# Patient Record
Sex: FEMALE | Race: BLACK OR AFRICAN AMERICAN | NOT HISPANIC OR LATINO | Employment: PART TIME | URBAN - METROPOLITAN AREA
[De-identification: names, ages, dates, MRNs, and addresses within clinical notes are randomized per-mention and may not be internally consistent; named-entity substitution may affect disease eponyms.]

---

## 2020-12-01 LAB
CRC RECOMMENDATION EXT: NORMAL
CRC RECOMMENDATION EXT: NORMAL

## 2024-07-16 ENCOUNTER — HOSPITAL ENCOUNTER (OUTPATIENT)
Dept: RADIOLOGY | Facility: HOSPITAL | Age: 49
Discharge: HOME | End: 2024-07-16
Attending: FAMILY MEDICINE
Payer: COMMERCIAL

## 2024-07-16 ENCOUNTER — OFFICE VISIT (OUTPATIENT)
Dept: FAMILY MEDICINE | Facility: CLINIC | Age: 49
End: 2024-07-16
Payer: COMMERCIAL

## 2024-07-16 VITALS
HEIGHT: 68 IN | WEIGHT: 270.38 LBS | BODY MASS INDEX: 40.98 KG/M2 | OXYGEN SATURATION: 100 % | HEART RATE: 68 BPM | DIASTOLIC BLOOD PRESSURE: 84 MMHG | SYSTOLIC BLOOD PRESSURE: 130 MMHG

## 2024-07-16 DIAGNOSIS — Z00.00 ENCOUNTER FOR MEDICAL EXAMINATION TO ESTABLISH CARE: Primary | ICD-10-CM

## 2024-07-16 DIAGNOSIS — Z13.220 LIPID SCREENING: ICD-10-CM

## 2024-07-16 DIAGNOSIS — G89.29 CHRONIC PAIN OF LEFT KNEE: ICD-10-CM

## 2024-07-16 DIAGNOSIS — M25.562 CHRONIC PAIN OF LEFT KNEE: ICD-10-CM

## 2024-07-16 DIAGNOSIS — K21.00 GASTROESOPHAGEAL REFLUX DISEASE WITH ESOPHAGITIS WITHOUT HEMORRHAGE: ICD-10-CM

## 2024-07-16 DIAGNOSIS — I10 HYPERTENSION, UNSPECIFIED TYPE: ICD-10-CM

## 2024-07-16 DIAGNOSIS — Z79.899 ON STIMULANT MEDICATION: ICD-10-CM

## 2024-07-16 DIAGNOSIS — Z11.59 NEED FOR HEPATITIS C SCREENING TEST: ICD-10-CM

## 2024-07-16 DIAGNOSIS — Z79.899 ENCOUNTER FOR LONG-TERM (CURRENT) USE OF OTHER MEDICATIONS: ICD-10-CM

## 2024-07-16 DIAGNOSIS — D53.9 NUTRITIONAL ANEMIA: ICD-10-CM

## 2024-07-16 DIAGNOSIS — Z11.4 SCREENING FOR HIV (HUMAN IMMUNODEFICIENCY VIRUS): ICD-10-CM

## 2024-07-16 DIAGNOSIS — Z12.31 SCREENING MAMMOGRAM FOR HIGH-RISK PATIENT: ICD-10-CM

## 2024-07-16 DIAGNOSIS — Z86.73 HISTORY OF STROKE: ICD-10-CM

## 2024-07-16 DIAGNOSIS — E11.65 TYPE 2 DIABETES MELLITUS WITH HYPERGLYCEMIA, WITHOUT LONG-TERM CURRENT USE OF INSULIN: ICD-10-CM

## 2024-07-16 PROBLEM — I66.01 STENOSIS OF RIGHT MIDDLE CEREBRAL ARTERY: Status: ACTIVE | Noted: 2019-05-20

## 2024-07-16 PROBLEM — I65.29 CAROTID STENOSIS: Status: ACTIVE | Noted: 2019-09-11

## 2024-07-16 PROBLEM — I63.9 ACUTE CVA (CEREBROVASCULAR ACCIDENT): Status: ACTIVE | Noted: 2019-05-20

## 2024-07-16 PROCEDURE — 73562 X-RAY EXAM OF KNEE 3: CPT | Mod: TC,PO,LT

## 2024-07-16 PROCEDURE — 73560 X-RAY EXAM OF KNEE 1 OR 2: CPT | Mod: 26,59,RT, | Performed by: RADIOLOGY

## 2024-07-16 PROCEDURE — 99386 PREV VISIT NEW AGE 40-64: CPT | Mod: S$GLB,,, | Performed by: FAMILY MEDICINE

## 2024-07-16 PROCEDURE — 1159F MED LIST DOCD IN RCRD: CPT | Mod: CPTII,S$GLB,, | Performed by: FAMILY MEDICINE

## 2024-07-16 PROCEDURE — 4010F ACE/ARB THERAPY RXD/TAKEN: CPT | Mod: CPTII,S$GLB,, | Performed by: FAMILY MEDICINE

## 2024-07-16 PROCEDURE — 73562 X-RAY EXAM OF KNEE 3: CPT | Mod: 26,LT,, | Performed by: RADIOLOGY

## 2024-07-16 PROCEDURE — 1160F RVW MEDS BY RX/DR IN RCRD: CPT | Mod: CPTII,S$GLB,, | Performed by: FAMILY MEDICINE

## 2024-07-16 PROCEDURE — 3079F DIAST BP 80-89 MM HG: CPT | Mod: CPTII,S$GLB,, | Performed by: FAMILY MEDICINE

## 2024-07-16 PROCEDURE — 3075F SYST BP GE 130 - 139MM HG: CPT | Mod: CPTII,S$GLB,, | Performed by: FAMILY MEDICINE

## 2024-07-16 PROCEDURE — 99999 PR PBB SHADOW E&M-EST. PATIENT-LVL V: CPT | Mod: PBBFAC,,, | Performed by: FAMILY MEDICINE

## 2024-07-16 PROCEDURE — 3008F BODY MASS INDEX DOCD: CPT | Mod: CPTII,S$GLB,, | Performed by: FAMILY MEDICINE

## 2024-07-16 RX ORDER — FERROUS SULFATE TAB 325 MG (65 MG ELEMENTAL FE) 325 (65 FE) MG
TAB ORAL EVERY OTHER DAY
COMMUNITY
End: 2024-07-16 | Stop reason: SDUPTHER

## 2024-07-16 RX ORDER — NAPROXEN SODIUM 220 MG/1
TABLET, FILM COATED ORAL
COMMUNITY
Start: 2024-07-01

## 2024-07-16 RX ORDER — CLOPIDOGREL BISULFATE 75 MG/1
1 TABLET ORAL DAILY
COMMUNITY

## 2024-07-16 RX ORDER — TIRZEPATIDE 10 MG/.5ML
10 INJECTION, SOLUTION SUBCUTANEOUS WEEKLY
Qty: 13 EACH | Refills: 1 | Status: SHIPPED | OUTPATIENT
Start: 2024-07-16

## 2024-07-16 RX ORDER — PANTOPRAZOLE SODIUM 40 MG/1
1 TABLET, DELAYED RELEASE ORAL DAILY
COMMUNITY
Start: 2023-10-02 | End: 2024-07-16 | Stop reason: SDUPTHER

## 2024-07-16 RX ORDER — TIRZEPATIDE 10 MG/.5ML
INJECTION, SOLUTION SUBCUTANEOUS
COMMUNITY
Start: 2024-06-20 | End: 2024-07-16 | Stop reason: SDUPTHER

## 2024-07-16 RX ORDER — LISINOPRIL 20 MG/1
20 TABLET ORAL DAILY
Qty: 60 TABLET | Refills: 4 | Status: SHIPPED | OUTPATIENT
Start: 2024-07-16

## 2024-07-16 RX ORDER — FERROUS SULFATE TAB 325 MG (65 MG ELEMENTAL FE) 325 (65 FE) MG
325 TAB ORAL EVERY OTHER DAY
Qty: 90 TABLET | Refills: 4 | Status: SHIPPED | OUTPATIENT
Start: 2024-07-16

## 2024-07-16 RX ORDER — DEXTROAMPHETAMINE SACCHARATE, AMPHETAMINE ASPARTATE, DEXTROAMPHETAMINE SULFATE AND AMPHETAMINE SULFATE 7.5; 7.5; 7.5; 7.5 MG/1; MG/1; MG/1; MG/1
30 TABLET ORAL 2 TIMES DAILY
COMMUNITY

## 2024-07-16 RX ORDER — PANTOPRAZOLE SODIUM 40 MG/1
40 TABLET, DELAYED RELEASE ORAL DAILY
Qty: 60 TABLET | Refills: 4 | Status: SHIPPED | OUTPATIENT
Start: 2024-07-16

## 2024-07-16 NOTE — PATIENT INSTRUCTIONS
Follow up in about 6 months (around 1/16/2025), or if symptoms worsen or fail to improve, for Med refills, LAB RESULTS.     Dear patient,   As a result of recent federal legislation (The Federal Cures Act), you may receive lab or pathology results from your visit in your [facility] account before your physician is able to contact you. Your physician or their representative will relay the results to you with their recommendations at their soonest availability.     If no improvement in symptoms or symptoms worsen, please be advised to call MD, follow-up at clinic and/or go to ER if becomes severe.    Maykel Oliver M.D.        We Offer TELEHEALTH & Same Day Appointments!   Book your Telehealth appointment with me through my nurse or   Clinic appointments on [employer]!    [address] [place] [address]    Office: [phone]   FAX: [phone]    Check out my Facebook Page and Follow Me at: [email or web address]    Check out my website at [employer] by clicking on: [email or web address]    To Schedule appointments online, go to [employer]: [email or web address]

## 2024-07-16 NOTE — PROGRESS NOTES
This note is specifically for wellness visit performed today.   WELLNESS EXAM      Patient ID: Alina Hyatt is a 48 y.o. female with  has a past medical history of Diabetes mellitus, type 2 and Hypertension.   Chief Complaint:  Encounter for wellness exam    Well Adult Physical: Patient here for a comprehensive physical exam.The patient reports Chronic problems.    New patient.  Patient is here to establish care. Transitioning care from [facility] PCP- Dr. Candelario ([facility]) [place]  OBGYN- Sol  Neuro- Gamaliel FELDER- Severo  GI- Dr. Orosco (last colon 2020)  Gastric- 2021- Dr. Hernandez - [facility]  Plastic Surgery Dr. Ram- breast reduction     History of Present Illness  The patient is a 48-year-old female here to establish care with a new doctor. She has chronic medical conditions including reflux, iron deficiency, some knee pain. She needs to see an orthopedic doctor. She has never been screened for hepatitis C or HIV. She agrees to screening. No known exposure. She would like to be checked for her anemia. She is due for a mammogram. The patient had a gastric bypass in 2021.    The patient, previously under the care of [facility], is due for blood work. She underwent a breast reduction surgery performed by Dr. Ram, a plastic surgeon in 2021. She is scheduled for a follow-up appointment next month. Her iron levels were found to be low during her last visit, necessitating daily iron supplementation. However, her levels returned to normal upon recheck in the early part of 2023. She was instructed to take iron tablets every other day, but she currently lacks a prescription. She has a history of reflux and is currently on Protonix. She had a mild stroke in 2019, for which she takes aspirin and Plavix. She experienced numbness radiating up her right arm at work, which led her to visit the emergency room. She has not seen Dr. Alston in over a year. Dr. 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 She is  "currently taking Adderall, which was prescribed by previous PCP, prior to her weight loss surgery.  Patient unsure of diagnosis.  She is also taking Mounjaro 10 mg once a week for diabetes. Her weight has remained stable. She has metformin, but she does not take it.    The patient experiences a popping sensation in her knee. She underwent imaging prior to her surgery approximately 3 years ago. Prior to her weight loss surgery, she was receiving knee injections. However, after a week, she noticed inflammation and swelling in her knee, which does not cause her discomfort.   Diabetes Management Status    Statin: Not taking  ACE/ARB: Taking    Screening or Prevention Patient's value Goal Complete/Controlled?   HgA1C Testing and Control   Lab Results   Component Value Date    HGBA1C 8.3 (H) 04/02/2007      Annually/Less than 8% No   Lipid profile Most Recent Lipid Panel Health Maintenance Topic Completion: Not Found Annually No   LDL control Lab Results   Component Value Date    LDLCALC 95.2 04/02/2007    Annually/Less than 100 mg/dl  No   Nephropathy screening Lab Results   Component Value Date    LABMICR 58.1 04/02/2007     No results found for: "PROTEINUA"  No results found for: "UTPCR"   Annually No   Blood pressure BP Readings from Last 1 Encounters:   07/16/24 130/84    Less than 140/90 Yes   Dilated retinal exam Most Recent Eye Exam Date: Not Found Annually No   Foot exam   Most Recent Foot Exam Date: Not Found Annually No     CHRONIC. STABLE. BP Reviewed.  Compliant with BP medications. No SE reported.   (-) CP, SOB, palpitations, dizziness, lightheadedness, HA, arm numbness, tingling or weakness, syncope.  Creatinine   Date Value Ref Range Status   06/29/2022 0.77 0.60 - 1.10 mg/dL Final   04/02/2007 0.9 0.5 - 1.4 mg/dl Final       CHRONIC.  History of stroke. Lab analysis reviewed.   (-) CP, SOB, abdominal pain, N/V/D, constipation, jaundice, skin changes.  (-) Myalgias  Lab Results   Component Value Date    " CHOL 149 04/02/2007     Lab Results   Component Value Date    HDL 35 (L) 04/02/2007     Lab Results   Component Value Date    LDLCALC 95.2 04/02/2007     Lab Results   Component Value Date    TRIG 94 04/02/2007     Lab Results   Component Value Date    CHOLHDL 23.5 04/02/2007     Lab Results   Component Value Date    TOTALCHOLEST 4.3 04/02/2007     Lab Results   Component Value Date    ALT 9 06/29/2022    AST 12 06/29/2022    ALKPHOS 89 06/29/2022    BILITOT 0.3 (L) 06/29/2022     ======================================================    Do you take any herbs or supplements that were not prescribed by a doctor? no   Are you taking calcium supplements? no      History:   LMP: No LMP recorded.   MMG:  No relevant family history has been documented.  History of breast reduction.  REASON FOR EXAM: [Z12.31]-Encounter for screening mammogram for malignant neoplasm of breast    TECHNICAL FACTORS: Digital mammography with tomosynthesis was performed of the breasts in the mediolateral oblique and craniocaudal views. CAD was utilized.    CLINICAL INFORMATION: This is a female patient for screening mammogram. According to the National Cancer [employer] Valerie Model risk assessment tool, her lifetime breast cancer risk is 8.9%.    COMPARISON: 05/04/2021, 07/30/2019, 01/11/2018    FINDINGS: The breasts are almost entirely fatty. There is no evidence of suspicious mass, calcifications or architectural distortion. Benign calcifications are present in the breasts.    IMPRESSION:  BI-RADS 1 - Negative.    No mammographic findings of malignancy are identified. Annual mammography is recommended.    BREAST DENSITY: Predominantly Fatty    The patient has been entered into our radiology information system, and she will receive notification approximately 30 days prior to the due date of her next annual screening mammogram.    Electronically signed by Irwin Moseley MD on 6/2/2022 9:23 AM  Exam End: 05/31/22 10:14    Specimen Collected:  22 09:09      PAP: No result found requesting records  Colon cancer screening:   Up-to-date.  Requesting records from GI.     Health Maintenance Topics with due status: Not Due       Topic Last Completion Date    Influenza Vaccine 2017    Colorectal Cancer Screening 2020      ==============================================  History reviewed.   Health Maintenance Due   Topic Date Due    Hepatitis C Screening  Never done    Cervical Cancer Screening  Never done    HIV Screening  Never done    TETANUS VACCINE  10/14/2019    Lipid Panel  2020    Hemoglobin A1c  2020    Mammogram  2023    Shingles Vaccine (1 of 2) Never done       Past Medical History:  Past Medical History:   Diagnosis Date    Diabetes mellitus, type 2     Hypertension      Past Surgical History:   Procedure Laterality Date     SECTION      CHOLECYSTECTOMY      TONSILLECTOMY      TUBAL LIGATION       Review of patient's allergies indicates:   Allergen Reactions    Amoxicillin-pot clavulanate Nausea And Vomiting    Ceftriaxone Other (See Comments) and Rash     Involuntary muscle movements of arms    Tramadol Anxiety and Other (See Comments)     Shaky, paranoid     Current Outpatient Medications on File Prior to Visit   Medication Sig Dispense Refill    aspirin 81 MG Chew CHEW 1 TABLET BY MOUTH ONCE DAILY      clopidogreL (PLAVIX) 75 mg tablet Take 1 tablet by mouth once daily.      dextroamphetamine-amphetamine (ADDERALL) 30 mg Tab Take 30 mg by mouth 2 (two) times a day.      [DISCONTINUED] FEROSUL 325 mg (65 mg iron) Tab tablet Take by mouth every other day.      [DISCONTINUED] lisinopril (PRINIVIL,ZESTRIL) 20 MG tablet Take 1 tablet (20 mg total) by mouth once daily. 30 tablet 0    [DISCONTINUED] MOUNJARO 10 mg/0.5 mL PnIj SMARTSIG:10 Milligram(s) SUB-Q Once a Week      [DISCONTINUED] pantoprazole (PROTONIX) 40 MG tablet Take 1 tablet by mouth once daily.      [DISCONTINUED] albuterol 90 mcg/actuation inhaler  Inhale 2 puffs into the lungs every 6 (six) hours as needed for Wheezing. 18 g 0    [DISCONTINUED] atenolol (TENORMIN) 25 MG tablet Take 25 mg by mouth once daily. (Patient not taking: Reported on 7/16/2024)      [DISCONTINUED] fluticasone (FLONASE) 50 mcg/actuation nasal spray 2 sprays by Each Nare route once daily. 16 g 0    [DISCONTINUED] metformin (GLUCOPHAGE) 1000 MG tablet Take 1 tablet (1,000 mg total) by mouth 2 (two) times daily with meals. (Patient not taking: Reported on 7/16/2024) 60 tablet 0     No current facility-administered medications on file prior to visit.     Social History     Socioeconomic History    Marital status: Single   Tobacco Use    Smoking status: Never     Social Determinants of Health     Transportation Needs: No Transportation Needs (10/5/2023)    Received from [facility]     Lack of Transportation (Medical): No     Lack of Transportation (Non-Medical): No     Family History   Problem Relation Name Age of Onset    Diabetes Mother         Review of Systems   Constitutional:  Negative for chills, fatigue, fever and unexpected weight change.   HENT:  Negative for ear pain and sore throat.    Eyes:  Negative for redness and visual disturbance.   Respiratory:  Negative for cough and shortness of breath.    Cardiovascular:  Negative for chest pain and palpitations.   Gastrointestinal:  Negative for nausea and vomiting.   Genitourinary:  Negative for difficulty urinating and hematuria.   Musculoskeletal:  Positive for arthralgias. Negative for myalgias.   Skin:  Negative for rash and wound.   Neurological:  Negative for weakness and headaches.   Psychiatric/Behavioral:  Negative for sleep disturbance. The patient is not nervous/anxious.       Objective:    Nursing note and vitals reviewed.  Vitals:    07/16/24 0849   BP: 130/84   Pulse: 68     Body mass index is 41.11 kg/m².   Physical Exam  Vitals and nursing note reviewed.   Constitutional:       General: She  is not in acute distress.     Appearance: She is well-developed. She is not ill-appearing, toxic-appearing or diaphoretic.   HENT:      Head: Normocephalic and atraumatic.      Right Ear: Hearing and external ear normal.      Left Ear: Hearing and external ear normal.      Nose: Nose normal. No rhinorrhea.   Eyes:      General: Lids are normal.      Extraocular Movements: Extraocular movements intact.      Conjunctiva/sclera: Conjunctivae normal.      Pupils: Pupils are equal, round, and reactive to light.   Cardiovascular:      Rate and Rhythm: Normal rate.      Pulses: Normal pulses.   Pulmonary:      Effort: Pulmonary effort is normal. No respiratory distress.      Breath sounds: Normal breath sounds.   Abdominal:      General: Bowel sounds are normal.      Palpations: Abdomen is soft.   Musculoskeletal:         General: Tenderness present. Normal range of motion.      Cervical back: Normal range of motion and neck supple.      Left knee: Bony tenderness and crepitus present. No swelling, deformity, effusion, erythema, ecchymosis or lacerations. Normal range of motion. Tenderness present.   Skin:     General: Skin is warm and dry.      Capillary Refill: Capillary refill takes less than 2 seconds.      Coloration: Skin is not pale.   Neurological:      General: No focal deficit present.      Mental Status: She is alert and oriented to person, place, and time. She is not disoriented.      Cranial Nerves: No cranial nerve deficit.      Motor: No weakness.      Gait: Gait normal.   Psychiatric:         Attention and Perception: She is attentive.         Mood and Affect: Mood normal. Mood is not anxious or depressed.         Speech: Speech is not rapid and pressured or slurred.         Behavior: Behavior normal. Behavior is not agitated, aggressive or hyperactive. Behavior is cooperative.         Thought Content: Thought content normal. Thought content is not paranoid or delusional. Thought content does not include  homicidal or suicidal ideation. Thought content does not include homicidal or suicidal plan.         Cognition and Memory: Memory is not impaired.         Judgment: Judgment normal.       Historical on 04/02/2007   Component Date Value Ref Range Status    WBC 04/02/2007 8.20  4.8 - 10.8 K/uL Final    RBC 04/02/2007 4.46  4.00 - 5.40 M/uL Final    Hemoglobin 04/02/2007 13.0  12.0 - 16.0 gm/dl Final    Hematocrit 04/02/2007 39.0  37.0 - 48.5 % Final    MCV 04/02/2007 87.4  82 - 95 fL Final    MCH 04/02/2007 29.1  27 - 31 pg Final    MCHC 04/02/2007 33.3  32 - 36 % Final    RDW 04/02/2007 13.4  11.5 - 14.5 % Final    Gran % 04/02/2007 46.8  40 - 76 % Final    Lymph % 04/02/2007 42.7 (H)  25 - 40 % Final    Mono % 04/02/2007 7.4  0 - 10 % Final    Eosinophil % 04/02/2007 2.9  0.0 - 8.0 % Final    Basophil % 04/02/2007 0.2  0 - 2 % Final    Gran # (ANC) 04/02/2007 3.8  1.4 - 8.7 K/uL Final    Lymph # 04/02/2007 3.5  1.2 - 3.5 K/uL Final    Mono # 04/02/2007 0.6  0.1 - 0.8 K/uL Final    Eos # 04/02/2007 0.2  0.0 - 0.4 K/uL Final    Baso # 04/02/2007 0.0  0.0 - 0.1 K/uL Final    Platelets 04/02/2007 265  150 - 350 K/uL Final    MPV 04/02/2007 11.3  9.2 - 12.9 fL Final    Glucose 04/02/2007 165 (H)  70 - 110 mg/dl Final    BUN 04/02/2007 9  5 - 23 mg/dl Final    Creatinine 04/02/2007 0.9  0.5 - 1.4 mg/dl Final    Calcium 04/02/2007 9.3  8.7 - 10.5 mg/dl Final    Sodium 04/02/2007 138  136 - 145 mMol/l Final    Potassium 04/02/2007 3.8  3.3 - 5.3 mMol/l Final    Chloride 04/02/2007 103  95 - 110 mMol/l Final    Total Protein 04/02/2007 7.1  6.0 - 8.4 gm/dl Final    Albumin 04/02/2007 4.1  3.5 - 5.2 g/dl Final    Total Bilirubin 04/02/2007 0.3  0.1 - 1.0 mg/dl Final    Comment: These are the guidelines recommended by the .  Especially  for infants and newborns, interpretation of results should be based  on gestational age, weight and in agreement with clinical  observations.  .  Premature Infant recommended  reference range (Bilirubin, Total)  Up to 24 hours.............<8.0 mg/dl  Up to 48 hours............<12.0 mg/dl  3-5 days..................<15.0 mg/dl  6-29 days.................<15.0 mg/dl      AST 04/02/2007 11  0 - 31 U/L Final    Alkaline Phosphatase 04/02/2007 99  45 - 130 U/L Final    CO2 04/02/2007 26  23.0 - 29.0 mEq/L Final    ALT 04/02/2007 10  0 - 31 U/L Final    Cholesterol 04/02/2007 149  120 - 199 mg/dL Final    Comment: The National Cholesterol Education Program (NCEP) has set the  following guidelines (reference ranges) for Cholesterol:  Desirable...................<200 mg/dL  Borderline High.............200-239 mg/dL  High........................> or = 240 mg/dL      Triglycerides 04/02/2007 94  30 - 150 mg/dL Final    Comment: The National Cholesterol Education Program (NCEP) has set the  following guidelines (reference values) for triglycerides:  Normal......................<150 mg/dL  Borderline High.............150-199 mg/dL  High........................200-499 mg/dL  Very High...................> or = 500 mg/dL      HDL 04/02/2007 35 (L)  40 - 63 mg/dL Final    Comment: The National Cholesterol Education Program (NCEP) has set the  following guidelines (reference values) for HDL Cholesterol:  Low...............<40  Optimal...........>60      LDL Cholesterol 04/02/2007 95.2  mg/dl Final    Comment: The National Cholesterol Education Program (NCEP) has set the  following guidelines (reference values) for LDL Cholesterol:  Desirable.....................<130 mg/dL  Borderline High...............130-159 mg/dL  High..........................> or = 160 mg/dL      HDL/Cholesterol Ratio 04/02/2007 23.5  % Final    Total Cholesterol/HDL Ratio 04/02/2007 4.3   Final    TSH 04/02/2007 3.0  0.4 - 4.0 uIU/ml Final    Free T4 04/02/2007 1.17  0.71 - 1.51 ng/dl Final    Hemoglobin A1C 04/02/2007 8.3 (H)  4.5 - 6.2 % Final    Microalbumin, Urine 04/02/2007 58.1  ug/ml Final    Creatinine, Urine 04/02/2007 375   0 - 400 mg/dl Final    Microalb/Creat Ratio 04/02/2007 15.5  0 - 30 mg/gm Final      Assessment / Plan:      1.  ANNUAL WELLNESS EXAM -patient here for annual wellness exam.  Labs ordered.  Health maintenance was reviewed and ordered.  Medications were reviewed and reconciled.   Anticipatory guidance: Don't smoke.  Healthy diet and regular exercise recommended. Vaccine recommendations discussed.  See orders.  Reviewed Anticipatory guidance, risk factor reduction interventions and counseling, Complete history , physical was completed today.  Complete and thorough medication reconciliation was performed.  Discussed risks and benefits of medications.  Advised patient on orders and health maintenance.  We discussed old records and old labs if available.  Will request any records not available through epic.  Continue current medications listed on your summary sheet.  Assessment & Plan  1. Establishment of care.  The patient's diabetes is well-managed, and her blood pressure is well-managed with lisinopril. Laboratory tests will be conducted to assess her blood counts, kidney function, liver function, thyroid function, A1c, cholesterol, iron, and folate levels. A referral will be made to Dr. Alston, a neurologist, for further evaluation. A refill of her Mounjaro prescription will be provided. An x-ray will be performed today, and a referral to an orthopedist will be made.    We discussed that Adderall is contraindicated with history of stroke.  She will follow-up with Neurology and or Psychiatry for further evaluation.  Diabetes:  Continue MOUNJARO.  Monitor A1c.  Target LDL less than 70 with history of stroke.  We will plan to monitor hemoglobin A1c at designated intervals 3 to 6 months.  I recommend ongoing Education for diabetic diet and exercise protocol.  We will continue to monitor for side effects.    Please be advised of symptoms to monitor for and to notify me immediately if persistent or worsening.  Follow up  with Ophthalmology/Optometry and Podiatry at least annually.    History of stroke: Continue Plavix.  Patient should follow-up with Neurology.  Stroke precautions.  Reduce risk factors.  Hypertension:Counseled on importance of hypertension disease course, I recommend ongoing Education for DASH-diet and exercise.  Counseled on medication regimen importance of treating high blood pressure.  Please be advised of risk of untreated blood pressure as discussed.  Please advised of ER precautions were given for symptoms of hypertensive urgency and emergency.  Anemia: Check levels.  Patient reports she was iron-deficiency and put on iron.  Patient does have history of gastric surgery.  Consider iron infusions through hematology if needing iron replacement.  Avoid anti-inflammatory medications with history of gastric surgery.  All questions were answered. Patient had no further concerns. Advised of Wellness plan. Follow up in 1 year for ANNUAL WELLNESS EXAM    Orders Placed This Encounter   Procedures    Mammo Digital Screening Bilat w/ Ishaan     Standing Status:   Future     Standing Expiration Date:   7/16/2026     Order Specific Question:   May the Radiologist modify the order per protocol to meet the clinical needs of the patient?     Answer:   Yes     Order Specific Question:   Release to patient     Answer:   Immediate    X-ray Knee Ortho Left     Standing Status:   Future     Standing Expiration Date:   7/16/2025     Order Specific Question:   May the Radiologist modify the order per protocol to meet the clinical needs of the patient?     Answer:   Yes     Order Specific Question:   Release to patient     Answer:   Immediate    Lipid Panel     Standing Status:   Future     Standing Expiration Date:   9/14/2025    Hepatitis C Antibody     Standing Status:   Future     Standing Expiration Date:   9/14/2025    HIV 1/2 Ag/Ab (4th Gen)     Standing Status:   Future     Standing Expiration Date:   9/14/2025    Hemoglobin A1C      Standing Status:   Future     Standing Expiration Date:   9/14/2025    CBC Without Differential     Standing Status:   Future     Standing Expiration Date:   9/14/2025    Comprehensive Metabolic Panel     Standing Status:   Future     Standing Expiration Date:   9/14/2025    TSH     Standing Status:   Future     Standing Expiration Date:   9/14/2025    Microalbumin/Creatinine Ratio, Urine     Standing Status:   Future     Standing Expiration Date:   9/14/2025     Order Specific Question:   Specimen Source     Answer:   Urine    Iron and TIBC     Standing Status:   Future     Standing Expiration Date:   9/14/2025    Ferritin     Standing Status:   Future     Standing Expiration Date:   9/14/2025    Vitamin B12     Standing Status:   Future     Standing Expiration Date:   9/14/2025    Folate     Standing Status:   Future     Standing Expiration Date:   9/14/2025    Ambulatory referral/consult to Orthopedics     Standing Status:   Future     Standing Expiration Date:   8/16/2025     Referral Priority:   Routine     Referral Type:   Consultation     Requested Specialty:   Orthopedic Surgery     Number of Visits Requested:   1    Ambulatory referral/consult to Neurology     Standing Status:   Future     Standing Expiration Date:   8/16/2025     Referral Priority:   Routine     Referral Type:   Consultation     Referral Reason:   Specialty Services Required     Requested Specialty:   Neurology     Number of Visits Requested:   1    Ambulatory referral/consult to Psychiatry     Standing Status:   Future     Standing Expiration Date:   8/16/2025     Referral Priority:   Routine     Referral Type:   Psychiatric     Referral Reason:   Specialty Services Required     Requested Specialty:   Psychiatry     Number of Visits Requested:   1     Medications Ordered This Encounter   Medications    FEROSUL 325 mg (65 mg iron) Tab tablet     Sig: Take 1 tablet (325 mg total) by mouth every other day.     Dispense:  90 tablet      Refill:  4    lisinopriL (PRINIVIL,ZESTRIL) 20 MG tablet     Sig: Take 1 tablet (20 mg total) by mouth once daily.     Dispense:  60 tablet     Refill:  4     .    MOUNJARO 10 mg/0.5 mL PnIj     Sig: Inject 10 mg into the skin once a week.     Dispense:  13 each     Refill:  1    pantoprazole (PROTONIX) 40 MG tablet     Sig: Take 1 tablet (40 mg total) by mouth once daily.     Dispense:  60 tablet     Refill:  4      Future Appointments       Date Provider Specialty Appt Notes    7/16/2024  Radiology .    7/16/2024  Lab .    7/19/2024 Jaxon Gilbert MD Orthopedics .            Maykel Oliver MD

## 2024-07-16 NOTE — PROGRESS NOTES
1st check to see if patient has seen the results.  If not then  CALL patient with results and Document verification.  Schedule follow-up if needed.  [phone]    X-ray of the left knee reviewed by radiology.  There are significant findings of osteoarthritis right greater than left.  I recommend that you proceed with treatment plan as we discussed.  Consider physical therapy.  If no improvement consider steroid injection into the knee.  Follow-up with orthopedics if no improvement.  You can try Tylenol arthritis strength.

## 2024-07-23 ENCOUNTER — PATIENT OUTREACH (OUTPATIENT)
Dept: ADMINISTRATIVE | Facility: HOSPITAL | Age: 49
End: 2024-07-23
Payer: COMMERCIAL

## 2024-09-06 ENCOUNTER — TELEPHONE (OUTPATIENT)
Dept: FAMILY MEDICINE | Facility: CLINIC | Age: 49
End: 2024-09-06
Payer: COMMERCIAL

## 2024-09-06 NOTE — TELEPHONE ENCOUNTER
----- Message from Jm Guzman MA sent at 9/5/2024  3:32 PM CDT -----  PA  ----- Message -----  From: Yumiko Burrows  Sent: 9/5/2024  12:16 PM CDT  To: [facility] Staff    Pim with [facility] called regarding a prior authorization for [employer]. Call [facility] to request PA for the member at  [phone]. Thx. EL

## 2024-10-21 PROBLEM — I63.9 ACUTE CVA (CEREBROVASCULAR ACCIDENT): Status: RESOLVED | Noted: 2019-05-20 | Resolved: 2024-10-21

## 2025-07-30 DIAGNOSIS — I10 HYPERTENSION: ICD-10-CM
